# Patient Record
Sex: MALE | Race: WHITE | NOT HISPANIC OR LATINO | URBAN - METROPOLITAN AREA
[De-identification: names, ages, dates, MRNs, and addresses within clinical notes are randomized per-mention and may not be internally consistent; named-entity substitution may affect disease eponyms.]

---

## 2021-01-26 ENCOUNTER — IMMUNIZATIONS (OUTPATIENT)
Dept: FAMILY MEDICINE CLINIC | Facility: HOSPITAL | Age: 46
End: 2021-01-26

## 2021-01-26 DIAGNOSIS — Z23 ENCOUNTER FOR IMMUNIZATION: Primary | ICD-10-CM

## 2021-01-26 PROCEDURE — 0011A SARS-COV-2 / COVID-19 MRNA VACCINE (MODERNA) 100 MCG: CPT

## 2021-01-26 PROCEDURE — 91301 SARS-COV-2 / COVID-19 MRNA VACCINE (MODERNA) 100 MCG: CPT

## 2021-02-22 ENCOUNTER — IMMUNIZATIONS (OUTPATIENT)
Dept: FAMILY MEDICINE CLINIC | Facility: HOSPITAL | Age: 46
End: 2021-02-22

## 2021-02-22 DIAGNOSIS — Z23 ENCOUNTER FOR IMMUNIZATION: Primary | ICD-10-CM

## 2021-02-22 PROCEDURE — 0012A SARS-COV-2 / COVID-19 MRNA VACCINE (MODERNA) 100 MCG: CPT

## 2021-02-22 PROCEDURE — 91301 SARS-COV-2 / COVID-19 MRNA VACCINE (MODERNA) 100 MCG: CPT

## 2021-11-12 ENCOUNTER — IMMUNIZATIONS (OUTPATIENT)
Dept: FAMILY MEDICINE CLINIC | Facility: HOSPITAL | Age: 46
End: 2021-11-12

## 2021-11-12 DIAGNOSIS — Z23 ENCOUNTER FOR IMMUNIZATION: Primary | ICD-10-CM

## 2021-11-12 PROCEDURE — 0013A COVID-19 MODERNA VACC 0.25 ML BOOSTER: CPT

## 2021-11-12 PROCEDURE — 91306 COVID-19 MODERNA VACC 0.25 ML BOOSTER: CPT

## 2021-12-09 ENCOUNTER — APPOINTMENT (OUTPATIENT)
Dept: LAB | Facility: CLINIC | Age: 46
End: 2021-12-09
Payer: COMMERCIAL

## 2021-12-09 DIAGNOSIS — Z13.220 SCREENING FOR LIPOID DISORDERS: ICD-10-CM

## 2021-12-09 LAB
ALBUMIN SERPL BCP-MCNC: 4.1 G/DL (ref 3.5–5)
ALP SERPL-CCNC: 59 U/L (ref 46–116)
ALT SERPL W P-5'-P-CCNC: 24 U/L (ref 12–78)
ANION GAP SERPL CALCULATED.3IONS-SCNC: 6 MMOL/L (ref 4–13)
AST SERPL W P-5'-P-CCNC: 22 U/L (ref 5–45)
BASOPHILS # BLD AUTO: 0.06 THOUSANDS/ΜL (ref 0–0.1)
BASOPHILS NFR BLD AUTO: 2 % (ref 0–1)
BILIRUB SERPL-MCNC: 1.64 MG/DL (ref 0.2–1)
BUN SERPL-MCNC: 20 MG/DL (ref 5–25)
CALCIUM SERPL-MCNC: 9.2 MG/DL (ref 8.3–10.1)
CHLORIDE SERPL-SCNC: 106 MMOL/L (ref 100–108)
CHOLEST SERPL-MCNC: 140 MG/DL
CO2 SERPL-SCNC: 25 MMOL/L (ref 21–32)
CREAT SERPL-MCNC: 1.05 MG/DL (ref 0.6–1.3)
EOSINOPHIL # BLD AUTO: 0.16 THOUSAND/ΜL (ref 0–0.61)
EOSINOPHIL NFR BLD AUTO: 4 % (ref 0–6)
ERYTHROCYTE [DISTWIDTH] IN BLOOD BY AUTOMATED COUNT: 13.2 % (ref 11.6–15.1)
GFR SERPL CREATININE-BSD FRML MDRD: 85 ML/MIN/1.73SQ M
GLUCOSE P FAST SERPL-MCNC: 103 MG/DL (ref 65–99)
HCT VFR BLD AUTO: 40.7 % (ref 36.5–49.3)
HCV AB SER QL: NORMAL
HDLC SERPL-MCNC: 67 MG/DL
HGB BLD-MCNC: 13.7 G/DL (ref 12–17)
IMM GRANULOCYTES # BLD AUTO: 0.01 THOUSAND/UL (ref 0–0.2)
IMM GRANULOCYTES NFR BLD AUTO: 0 % (ref 0–2)
LDLC SERPL CALC-MCNC: 64 MG/DL (ref 0–100)
LYMPHOCYTES # BLD AUTO: 1.65 THOUSANDS/ΜL (ref 0.6–4.47)
LYMPHOCYTES NFR BLD AUTO: 42 % (ref 14–44)
MCH RBC QN AUTO: 32.6 PG (ref 26.8–34.3)
MCHC RBC AUTO-ENTMCNC: 33.7 G/DL (ref 31.4–37.4)
MCV RBC AUTO: 97 FL (ref 82–98)
MONOCYTES # BLD AUTO: 0.32 THOUSAND/ΜL (ref 0.17–1.22)
MONOCYTES NFR BLD AUTO: 8 % (ref 4–12)
NEUTROPHILS # BLD AUTO: 1.78 THOUSANDS/ΜL (ref 1.85–7.62)
NEUTS SEG NFR BLD AUTO: 44 % (ref 43–75)
NONHDLC SERPL-MCNC: 73 MG/DL
NRBC BLD AUTO-RTO: 0 /100 WBCS
PLATELET # BLD AUTO: 175 THOUSANDS/UL (ref 149–390)
PMV BLD AUTO: 12.3 FL (ref 8.9–12.7)
POTASSIUM SERPL-SCNC: 4 MMOL/L (ref 3.5–5.3)
PROT SERPL-MCNC: 6.8 G/DL (ref 6.4–8.2)
RBC # BLD AUTO: 4.2 MILLION/UL (ref 3.88–5.62)
SODIUM SERPL-SCNC: 137 MMOL/L (ref 136–145)
TRIGL SERPL-MCNC: 47 MG/DL
WBC # BLD AUTO: 3.98 THOUSAND/UL (ref 4.31–10.16)

## 2021-12-09 PROCEDURE — 80061 LIPID PANEL: CPT

## 2021-12-09 PROCEDURE — 82785 ASSAY OF IGE: CPT

## 2021-12-09 PROCEDURE — 36415 COLL VENOUS BLD VENIPUNCTURE: CPT

## 2021-12-09 PROCEDURE — 86003 ALLG SPEC IGE CRUDE XTRC EA: CPT

## 2021-12-09 PROCEDURE — 80053 COMPREHEN METABOLIC PANEL: CPT

## 2021-12-09 PROCEDURE — 85025 COMPLETE CBC W/AUTO DIFF WBC: CPT

## 2021-12-09 PROCEDURE — 86803 HEPATITIS C AB TEST: CPT

## 2021-12-10 LAB
ALLERGEN COMMENT: ABNORMAL
ALLERGEN COMMENT: NORMAL
ALMOND IGE QN: <0.1 KUA/I
CASHEW NUT IGE QN: <0.1 KUA/I
CLAM IGE QN: <0.1 KUA/L
CODFISH IGE QN: <0.1 KUA/I
CRAB IGE QN: <0.1 KUA/L
EGG WHITE IGE QN: <0.1 KUA/I
GLUTEN IGE QN: <0.1 KUA/I
HAZELNUT IGE QN: 1.28 KUA/L
LOBSTER IGE QN: <0.1 KUA/L
MILK IGE QN: <0.1 KUA/I
OYSTER IGE QN: <0.1 KUA/L
PEANUT IGE QN: <0.1 KUA/I
SALMON IGE QN: <0.1 KUA/I
SCALLOP IGE QN: <0.1 KUA/L
SCALLOP IGE QN: <0.1 KUA/L
SESAME SEED IGE QN: <0.1 KUA/I
SHRIMP IGE QN: <0.1 KUA/L
SHRIMP IGE QN: <0.1 KUA/L
SOYBEAN IGE QN: <0.1 KUA/I
TOTAL IGE SMQN RAST: 39.5 KU/L (ref 0–113)
TOTAL IGE SMQN RAST: 42.5 KU/L (ref 0–113)
TUNA IGE QN: <0.1 KUA/I
WALNUT IGE QN: <0.1 KUA/I
WHEAT IGE QN: <0.1 KUA/I

## 2023-03-29 ENCOUNTER — TELEPHONE (OUTPATIENT)
Dept: GASTROENTEROLOGY | Facility: CLINIC | Age: 48
End: 2023-03-29

## 2023-03-29 NOTE — TELEPHONE ENCOUNTER
----- Message from Bienvenido Dolan sent at 3/28/2023  1:15 PM EDT -----  Regarding: Schedule colon    ----- Message -----  From: Kalyn Saldana DO  Sent: 3/28/2023   1:14 PM EDT  To: Freddie Quinonez DO, #    Please schedule pt for colonoscopy with Dr Macie Sunshine at [facility]  Please send to their pool  I will order him clenpiq once I find out what pharmacy he prefers  Dr Macie Sunshine- this is a family friend of mine, referral for colon cancer screening for you  Thank you very much    Brian Allison

## 2023-04-06 ENCOUNTER — TELEPHONE (OUTPATIENT)
Dept: GASTROENTEROLOGY | Facility: CLINIC | Age: 48
End: 2023-04-06

## 2023-04-06 NOTE — TELEPHONE ENCOUNTER
----- Message from Tom Mccullough sent at 3/28/2023  1:15 PM EDT -----  Regarding: Schedule colon    ----- Message -----  From: Danelle Libman Chaput, DO  Sent: 3/28/2023   1:14 PM EDT  To: Adenike Marsh DO, #    Please schedule pt for colonoscopy with Dr Rafaela Portillo at [facility]  Please send to their pool  I will order him clenpiq once I find out what pharmacy he prefers  Dr Rafaela Portillo- this is a family friend of mine, referral for colon cancer screening for you  Thank you very much    Tyrell Gardner

## 2023-05-03 ENCOUNTER — TELEPHONE (OUTPATIENT)
Dept: GASTROENTEROLOGY | Facility: CLINIC | Age: 48
End: 2023-05-03

## 2023-05-03 NOTE — TELEPHONE ENCOUNTER
Procedure confirmed  Colonoscopy     Via: Spoke with patient  Instructions given: Email or Given to Patient at Visit     Prep Given: Clenpiq    Call the office if there are any questions

## 2023-05-17 ENCOUNTER — ANESTHESIA (OUTPATIENT)
Dept: GASTROENTEROLOGY | Facility: AMBULATORY SURGERY CENTER | Age: 48
End: 2023-05-17

## 2023-05-17 ENCOUNTER — HOSPITAL ENCOUNTER (OUTPATIENT)
Dept: GASTROENTEROLOGY | Facility: AMBULATORY SURGERY CENTER | Age: 48
Discharge: HOME | End: 2023-05-17

## 2023-05-17 ENCOUNTER — ANESTHESIA EVENT (OUTPATIENT)
Dept: GASTROENTEROLOGY | Facility: AMBULATORY SURGERY CENTER | Age: 48
End: 2023-05-17

## 2023-05-17 VITALS
HEIGHT: 70 IN | OXYGEN SATURATION: 97 % | RESPIRATION RATE: 16 BRPM | DIASTOLIC BLOOD PRESSURE: 63 MMHG | SYSTOLIC BLOOD PRESSURE: 101 MMHG | WEIGHT: 180 LBS | TEMPERATURE: 97.5 F | HEART RATE: 57 BPM | BODY MASS INDEX: 25.77 KG/M2

## 2023-05-17 DIAGNOSIS — Z12.11 COLON CANCER SCREENING: ICD-10-CM

## 2023-05-17 RX ORDER — SODIUM CHLORIDE, SODIUM LACTATE, POTASSIUM CHLORIDE, CALCIUM CHLORIDE 600; 310; 30; 20 MG/100ML; MG/100ML; MG/100ML; MG/100ML
50 INJECTION, SOLUTION INTRAVENOUS CONTINUOUS
Status: DISCONTINUED | OUTPATIENT
Start: 2023-05-17 | End: 2023-05-21 | Stop reason: HOSPADM

## 2023-05-17 RX ORDER — PROPOFOL 10 MG/ML
INJECTION, EMULSION INTRAVENOUS AS NEEDED
Status: DISCONTINUED | OUTPATIENT
Start: 2023-05-17 | End: 2023-05-17

## 2023-05-17 RX ADMIN — PROPOFOL 50 MG: 10 INJECTION, EMULSION INTRAVENOUS at 10:26

## 2023-05-17 RX ADMIN — PROPOFOL 30 MG: 10 INJECTION, EMULSION INTRAVENOUS at 10:38

## 2023-05-17 RX ADMIN — PROPOFOL 130 MG: 10 INJECTION, EMULSION INTRAVENOUS at 10:23

## 2023-05-17 RX ADMIN — PROPOFOL 50 MG: 10 INJECTION, EMULSION INTRAVENOUS at 10:24

## 2023-05-17 RX ADMIN — PROPOFOL 40 MG: 10 INJECTION, EMULSION INTRAVENOUS at 10:40

## 2023-05-17 RX ADMIN — SODIUM CHLORIDE, SODIUM LACTATE, POTASSIUM CHLORIDE, CALCIUM CHLORIDE 50 ML/HR: 600; 310; 30; 20 INJECTION, SOLUTION INTRAVENOUS at 10:13

## 2023-05-17 RX ADMIN — PROPOFOL 50 MG: 10 INJECTION, EMULSION INTRAVENOUS at 10:34

## 2023-05-17 NOTE — ANESTHESIA POSTPROCEDURE EVALUATION
Post-Op Assessment Note    CV Status:  Stable  Pain Score: 0    Pain management: adequate     Mental Status:  Sleepy   Hydration Status:  Euvolemic   PONV Controlled:  Controlled   Airway Patency:  Patent      Post Op Vitals Reviewed: Yes      Staff: Anesthesiologist, CRNA         No notable events documented      BP   104/58   Temp      Pulse  76   Resp   18   SpO2   99

## 2023-05-17 NOTE — ANESTHESIA PREPROCEDURE EVALUATION
Procedure:  COLONOSCOPY    Relevant Problems   No relevant active problems        Physical Exam    Airway    Mallampati score: II  TM Distance: >3 FB  Neck ROM: full     Dental   No notable dental hx     Cardiovascular      Pulmonary      Other Findings        Anesthesia Plan  ASA Score- 1     Anesthesia Type- IV sedation with anesthesia with ASA Monitors  Additional Monitors:   Airway Plan:           Plan Factors-Exercise tolerance (METS): >4 METS  Chart reviewed  Patient summary reviewed  Patient is not a current smoker  Induction- intravenous  Postoperative Plan-     Informed Consent- Anesthetic plan and risks discussed with patient  I personally reviewed this patient with the CRNA  Discussed and agreed on the Anesthesia Plan with the CRNA  Mitali Garcia

## 2023-05-17 NOTE — H&P
History and Physical -  [facility]  Brad Qureshi 56 y o  male MRN: [id number]    HPI: Brad Qureshi is a 56y o  year old male who presents for CRC screening colonoscopy  He has never had any screening and denies family history of colon cancer  No GI complaints  REVIEW OF SYSTEMS: Per the HPI, and otherwise unremarkable  Historical Information   Past Medical History:   Diagnosis Date   • Patient denies medical problems      Past Surgical History:   Procedure Laterality Date   • NO PAST SURGERIES       Social History   Social History     Substance and Sexual Activity   Alcohol Use None     Social History     Substance and Sexual Activity   Drug Use Not on file     Social History     Tobacco Use   Smoking Status Not on file   Smokeless Tobacco Not on file     No family history on file  Meds/Allergies       Current Outpatient Medications:   •  Clenpiq 10-3 5-12 MG-GM -GM/160ML SOLN  •  fluticasone (FLONASE) 50 mcg/act nasal spray    Current Facility-Administered Medications:   •  lactated ringers infusion, 50 mL/hr, Intravenous, Continuous    Allergies   Allergen Reactions   • Other Rash     Bettina Nuts-Rash  Seasonal allergies- Allergic Rhinitis       Objective     There were no vitals taken for this visit  PHYSICAL EXAM    Gen: NAD AAOx3  Head: Normocephalic, Atraumatic  CV: S1S2 RRR no m/r/g  CHEST: Clear b/l no c/r/w  ABD: soft, +BS NT/ND  EXT: no edema    ASSESSMENT/PLAN:  This is a 56y o  year old male here for average risk CRC screening colonoscopy, and he is stable and optimized for his procedure

## 2025-01-31 ENCOUNTER — OFFICE VISIT (OUTPATIENT)
Dept: URGENT CARE | Facility: CLINIC | Age: 50
End: 2025-01-31
Payer: COMMERCIAL

## 2025-01-31 VITALS
BODY MASS INDEX: 25.77 KG/M2 | HEART RATE: 62 BPM | RESPIRATION RATE: 16 BRPM | WEIGHT: 180 LBS | TEMPERATURE: 97.5 F | HEIGHT: 70 IN | DIASTOLIC BLOOD PRESSURE: 72 MMHG | OXYGEN SATURATION: 97 % | SYSTOLIC BLOOD PRESSURE: 126 MMHG

## 2025-01-31 DIAGNOSIS — U07.1 COVID-19 VIRUS RNA TEST RESULT POSITIVE AT LIMIT OF DETECTION: Primary | ICD-10-CM

## 2025-01-31 LAB
SARS-COV-2 AG UPPER RESP QL IA: POSITIVE
VALID CONTROL: ABNORMAL

## 2025-01-31 PROCEDURE — 87811 SARS-COV-2 COVID19 W/OPTIC: CPT | Performed by: NURSE PRACTITIONER

## 2025-01-31 PROCEDURE — S9083 URGENT CARE CENTER GLOBAL: HCPCS | Performed by: NURSE PRACTITIONER

## 2025-01-31 PROCEDURE — G0382 LEV 3 HOSP TYPE B ED VISIT: HCPCS | Performed by: NURSE PRACTITIONER

## 2025-01-31 RX ORDER — BROMPHENIRAMINE MALEATE, PSEUDOEPHEDRINE HYDROCHLORIDE, AND DEXTROMETHORPHAN HYDROBROMIDE 2; 30; 10 MG/5ML; MG/5ML; MG/5ML
10 SYRUP ORAL 3 TIMES DAILY PRN
Qty: 150 ML | Refills: 0 | Status: SHIPPED | OUTPATIENT
Start: 2025-01-31

## 2025-01-31 NOTE — PROGRESS NOTES
[facility]        NAME: Erwin Kent is a 49 y.o. male  : 1975    MRN: [id number]  DATE: 2025  TIME: 8:48 AM    Assessment and Plan   COVID-19 virus RNA test result positive at limit of detection [U07.1]  1. COVID-19 virus RNA test result positive at limit of detection  Poct Covid 19 Rapid Antigen Test    Covid/Flu- Office Collect Normal    Covid/Flu- Office Collect Normal    brompheniramine-pseudoephedrine-DM 30-2-10 MG/5ML syrup            Patient Instructions     Rapid covid test is positive  No need for covid medication; mild symptoms  Bromfed for cough and congestion   Follow up with PCP in 3-5 days.  Proceed to  ER if symptoms worsen.    If tests have been performed at [facility], our office will contact you with results if changes need to be made to the care plan discussed with you at the visit.  [facility]    Chief Complaint     Chief Complaint   Patient presents with    Cold Like Symptoms     Pt reports cold like symptoms with onset Wednesday. States fever, body aches, and chills with onset yesterday. Managing symptoms with otc sinus decongestant, Ibuprofen. Denies testing at home for Covid. States in household exposure to Flu last week.         History of Present Illness       HPI  Presents to the clinic for complain of bodyaches, fatigue, head congestion, fever of 100 degrees and sore throat. He has felt slightly better today but symptoms persist. Son had flu last week.     Review of Systems   Review of Systems   Constitutional:  Positive for fatigue and fever.   HENT:  Positive for congestion and sore throat. Negative for ear pain and rhinorrhea.    Respiratory:  Negative for cough and wheezing.    Cardiovascular:  Negative for chest pain.   Gastrointestinal:  Negative for diarrhea and vomiting.   Musculoskeletal:  Positive for myalgias.   Neurological:  Positive for headaches.         Current Medications       Current Outpatient  "Medications:     brompheniramine-pseudoephedrine-DM 30-2-10 MG/5ML syrup, Take 10 mL by mouth 3 (three) times a day as needed for cough or congestion, Disp: 150 mL, Rfl: 0    fluticasone (FLONASE) 50 mcg/act nasal spray, 1 spray into each nostril daily (Patient not taking: Reported on 1/31/2025), Disp: , Rfl:     Current Allergies     Allergies as of 01/31/2025 - Reviewed 01/31/2025   Allergen Reaction Noted    Other Rash 05/15/2023            The following portions of the patient's history were reviewed and updated as appropriate: allergies, current medications, past family history, past medical history, past social history, past surgical history and problem list.     Past Medical History:   Diagnosis Date    Asthma     pt states it is aggrevated by [facility] allergies    Patient denies medical problems        Past Surgical History:   Procedure Laterality Date    NO PAST SURGERIES      WISDOM TOOTH EXTRACTION         No family history on file.      Medications have been verified.        Objective   /72 (BP Location: Left arm, Patient Position: Sitting, Cuff Size: Standard)   Pulse 62   Temp 97.5 °F (36.4 °C) (Tympanic)   Resp 16   Ht 5' 10\" (1.778 m)   Wt 81.6 kg (180 lb)   SpO2 97%   BMI 25.83 kg/m²   No LMP for male patient.       Physical Exam     Physical Exam  Constitutional:       Appearance: He is not ill-appearing.   HENT:      Right Ear: Tympanic membrane normal.      Left Ear: Tympanic membrane normal.      Nose: Rhinorrhea present.      Mouth/Throat:      Pharynx: No posterior oropharyngeal erythema.   Cardiovascular:      Rate and Rhythm: Regular rhythm.      Heart sounds: Normal heart sounds.   Pulmonary:      Effort: Pulmonary effort is normal.      Breath sounds: Normal breath sounds. No wheezing.   Lymphadenopathy:      Cervical: No cervical adenopathy.                   "

## 2025-01-31 NOTE — LETTER
January 31, 2025     Patient: Erwin Kent   YOB: 1975   Date of Visit: 1/31/2025       To Whom it May Concern:    Erwin Kent was seen in my clinic on 1/31/2025. He may return to work on 02/01/2025 . Must adhere to strict masking until 02/05/2025. Has to be fever free for 24 hrs without fever medication, to return.    If you have any questions or concerns, please don't hesitate to call.         Sincerely,          MATEO Hewitt        CC: No Recipients

## 2025-07-22 ENCOUNTER — OFFICE VISIT (OUTPATIENT)
Dept: FAMILY MEDICINE CLINIC | Facility: CLINIC | Age: 50
End: 2025-07-22
Payer: COMMERCIAL

## 2025-07-22 VITALS
SYSTOLIC BLOOD PRESSURE: 90 MMHG | HEIGHT: 69 IN | OXYGEN SATURATION: 98 % | RESPIRATION RATE: 18 BRPM | BODY MASS INDEX: 27.59 KG/M2 | WEIGHT: 186.25 LBS | TEMPERATURE: 97.8 F | DIASTOLIC BLOOD PRESSURE: 70 MMHG | HEART RATE: 60 BPM

## 2025-07-22 DIAGNOSIS — R35.1 NOCTURIA: Primary | ICD-10-CM

## 2025-07-22 DIAGNOSIS — Z00.00 WELL ADULT EXAM: ICD-10-CM

## 2025-07-22 PROCEDURE — 99386 PREV VISIT NEW AGE 40-64: CPT | Performed by: FAMILY MEDICINE

## 2025-07-22 NOTE — ASSESSMENT & PLAN NOTE
Well adult.  Overall the patient appears to be in stable health.  He will obtain blood work as ordered for further evaluation.  We will make further recommendations pending results of test.  His colonoscopy is up-to-date.

## 2025-07-22 NOTE — PROGRESS NOTES
FAMILY PRACTICE OFFICE VISIT       NAME: Erwin Kent  AGE: 50 y.o. SEX: male       : 1975        MRN: [id number]    DATE: 2025  TIME: 10:44 AM    Assessment and Plan     Problem List Items Addressed This Visit       Well adult exam    Well adult.  Overall the patient appears to be in stable health.  He will obtain blood work as ordered for further evaluation.  We will make further recommendations pending results of test.  His colonoscopy is up-to-date.         Relevant Orders    CBC    Comprehensive metabolic panel    Lipid panel    TSH, 3rd generation    PSA, Total Screen    Nocturia - Primary    Relevant Orders    CBC    Comprehensive metabolic panel    Lipid panel    TSH, 3rd generation    PSA, Total Screen           Chief Complaint     Chief Complaint   Patient presents with    Establish Care     New patient     Well Check     Physical        History of Present Illness     Patient in the office for annual wellness exam and to establish new PCP.  Patient previously was seen at [facility] internal medicine office in [place].  He denies any recent illness.  Patient lives with his wife and 2 teenage sons.  Patient enjoys playing tennis at least twice a week for exercise.  He denies any changes in weight.  His colonoscopy is up-to-date from .  Patient does have a family history in his father and grandparent of prostate cancer.    Patient is a non-smoker and uses Flonase on occasions for his seasonal allergies.  Patient does obtain influenza and COVID vaccines on a yearly basis.  His wife works as a  teaching eighth grade students.        Review of Systems   Review of Systems   Constitutional: Negative.    HENT: Negative.     Eyes: Negative.    Respiratory: Negative.     Cardiovascular: Negative.    Gastrointestinal: Negative.    Genitourinary: Negative.    Musculoskeletal: Negative.    Skin: Negative.    Allergic/Immunologic: Positive for environmental allergies.    Neurological: Negative.    Psychiatric/Behavioral: Negative.         Active Problem List   Problem List[1]    Past Medical History:  Past Medical History[2]    Past Surgical History:  Past Surgical History[3]    Family History:  Family History[4]    Social History:  Social History     Socioeconomic History    Marital status: /Civil Union     Spouse name: Not on file    Number of children: Not on file    Years of education: Not on file    Highest education level: Not on file   Occupational History    Not on file   Tobacco Use    Smoking status: Never    Smokeless tobacco: Never   Vaping Use    Vaping status: Never Used   Substance and Sexual Activity    Alcohol use: Yes    Drug use: Never    Sexual activity: Not on file   Other Topics Concern    Not on file   Social History Narrative    Not on file     Social Drivers of Health     Financial Resource Strain: Not on file   Food Insecurity: Not on file   Transportation Needs: Not on file   Physical Activity: Not on file   Stress: Not on file   Social Connections: Not on file   Intimate Partner Violence: Not on file   Housing Stability: Not on file       Objective     Vitals:    07/22/25 1010   BP: 90/70   Pulse: 60   Resp: 18   Temp: 97.8 °F (36.6 °C)   SpO2: 98%     Wt Readings from Last 3 Encounters:   07/22/25 84.5 kg (186 lb 4 oz)   01/31/25 81.6 kg (180 lb)   05/17/23 81.6 kg (180 lb)       Physical Exam  Constitutional:       General: He is not in acute distress.     Appearance: Normal appearance. He is not ill-appearing.   HENT:      Head: Normocephalic and atraumatic.      Right Ear: Tympanic membrane, ear canal and external ear normal. There is no impacted cerumen.      Left Ear: Tympanic membrane, ear canal and external ear normal. There is no impacted cerumen.     Eyes:      General:         Right eye: No discharge.         Left eye: No discharge.      Extraocular Movements: Extraocular movements intact.      Conjunctiva/sclera: Conjunctivae normal.  "     Pupils: Pupils are equal, round, and reactive to light.     Neck:      Vascular: No carotid bruit.     Cardiovascular:      Rate and Rhythm: Normal rate and regular rhythm.      Heart sounds: Normal heart sounds. No murmur heard.  Pulmonary:      Effort: Pulmonary effort is normal.      Breath sounds: Normal breath sounds. No wheezing, rhonchi or rales.   Abdominal:      General: Abdomen is flat. Bowel sounds are normal. There is no distension.      Palpations: Abdomen is soft.      Tenderness: There is no abdominal tenderness. There is no guarding or rebound.     Musculoskeletal:      Right lower leg: No edema.      Left lower leg: No edema.   Lymphadenopathy:      Cervical: No cervical adenopathy.     Skin:     Findings: No rash.     Neurological:      General: No focal deficit present.      Mental Status: He is alert and oriented to person, place, and time.      Cranial Nerves: No cranial nerve deficit.     Psychiatric:         Mood and Affect: Mood normal.         Behavior: Behavior normal.         Thought Content: Thought content normal.         Judgment: Judgment normal.         Pertinent Laboratory/Diagnostic Studies:  Lab Results   Component Value Date    BUN 20 12/09/2021    CREATININE 1.05 12/09/2021    CALCIUM 9.2 12/09/2021    K 4.0 12/09/2021    CO2 25 12/09/2021     12/09/2021     Lab Results   Component Value Date    ALT 21 02/01/2022    AST 13 02/01/2022    ALKPHOS 63 02/01/2022       Lab Results   Component Value Date    WBC 3.98 (L) 12/09/2021    HGB 13.7 12/09/2021    HCT 40.7 12/09/2021    MCV 97 12/09/2021     12/09/2021       No results found for: \"TSH\"    No results found for: \"CHOL\"  Lab Results   Component Value Date    TRIG 47 12/09/2021     Lab Results   Component Value Date    HDL 67 12/09/2021     Lab Results   Component Value Date    LDLCALC 64 12/09/2021     No results found for: \"HGBA1C\"    Results for orders placed or performed in visit on 01/31/25   Poct Covid 19 " Rapid Antigen Test   Result Value Ref Range    POCT SARS-CoV-2 Ag Positive (A) Negative    VALID CONTROL Valid        Orders Placed This Encounter   Procedures    CBC    Comprehensive metabolic panel    Lipid panel    TSH, 3rd generation    PSA, Total Screen       ALLERGIES:  Allergies[5]    Current Medications   Current Medications[6]      Health Maintenance     Health Maintenance   Topic Date Due    HIV Screening  Never done    Annual Physical  Never done    COVID-19 Vaccine (6 - 2024-25 season) 09/01/2024    Influenza Vaccine (1) 09/01/2025    Zoster Vaccine (1 of 2) 06/07/2026    Depression Screening  07/22/2026    DTaP,Tdap,and Td Vaccines (2 - Td or Tdap) 02/08/2027    Colorectal Cancer Screening  05/14/2033    Hepatitis C Screening  Completed    Meningococcal B Vaccine  Aged Out    RSV Vaccine age 0-20 Months  Aged Out    Pneumococcal Vaccine: Pediatrics (0 to 5 Years) and At-Risk Patients (6 to 49 Years)  Aged Out    HIB Vaccine  Aged Out    IPV Vaccine  Aged Out    Hepatitis A Vaccine  Aged Out    Meningococcal ACWY Vaccine  Aged Out    HPV Vaccine  Aged Out     Immunization History   Administered Date(s) Administered    COVID-19 MODERNA VACC 0.25 ML IM BOOSTER 11/12/2021    COVID-19 MODERNA VACC 0.5 ML IM 01/26/2021, 02/22/2021    COVID-19 Moderna mRNA Vaccine 12 Yr+ 50 mcg/0.5 mL (Spikevax) 11/09/2023    COVID-19 Pfizer Vac BIVALENT Roberto-sucrose 12 Yr+ IM 03/31/2023    INFLUENZA 01/10/2020, 10/29/2020, 10/25/2021, 12/06/2022, 11/09/2023    Influenza Injectable, MDCK, Preservative Free, Quadrivalent, 0.5 mL 10/29/2020    Tdap 02/08/2017       Malick Blackman MD             [1]   Patient Active Problem List  Diagnosis    Well adult exam    Nocturia   [2]   Past Medical History:  Diagnosis Date    Asthma     pt states it is aggrevated by seasoanl allergies    Patient denies medical problems    [3]   Past Surgical History:  Procedure Laterality Date    NO PAST SURGERIES      WISDOM TOOTH EXTRACTION     [4] No  family history on file.  [5]   Allergies  Allergen Reactions    Pollen Extract Hives    Other Rash     Bettina Nuts-Rash  Seasonal allergies- Allergic Rhinitis   [6]   Current Outpatient Medications   Medication Sig Dispense Refill    fluticasone (FLONASE) 50 mcg/act nasal spray 1 spray into each nostril daily (Patient not taking: Reported on 1/31/2025)       No current facility-administered medications for this visit.